# Patient Record
Sex: MALE | URBAN - METROPOLITAN AREA
[De-identification: names, ages, dates, MRNs, and addresses within clinical notes are randomized per-mention and may not be internally consistent; named-entity substitution may affect disease eponyms.]

---

## 2021-02-22 ENCOUNTER — OFFICE VISIT (OUTPATIENT)
Dept: URBAN - METROPOLITAN AREA CLINIC 62 | Facility: CLINIC | Age: 71
End: 2021-02-22
Payer: COMMERCIAL

## 2021-02-22 DIAGNOSIS — H04.123 DRY EYE SYNDROME OF BILATERAL LACRIMAL GLANDS: ICD-10-CM

## 2021-02-22 DIAGNOSIS — H35.3221 EXUDATIVE AGE-RELATED MACULAR DEGENERATION OF LEFT EYE W/ ACTIVE CHOROIDAL NEOVASCULARIZATION: Primary | ICD-10-CM

## 2021-02-22 PROCEDURE — 99204 OFFICE O/P NEW MOD 45 MIN: CPT | Performed by: OPTOMETRIST

## 2021-02-22 ASSESSMENT — VISUAL ACUITY
OS: CF 3FT
OD: 20/60

## 2021-02-22 ASSESSMENT — KERATOMETRY
OS: 43.75
OD: 43.63

## 2021-02-22 NOTE — IMPRESSION/PLAN
Impression: Exudative age-related macular degeneration of left eye w/ active choroidal neovascularization: H35.4086. Plan: Refer to Ret[facility].  Dr. Gricel Gambino for eval.

## 2021-02-22 NOTE — IMPRESSION/PLAN
Impression: Other age-related cataract: H25.89. Plan: Cataracts account for the patient's complaints. Discussed all risks, benefits, procedures and recovery. Patient understands changing glasses will not improve vision. Patient desires to have surgery and referred to Dr. Joaquín Ferro for phacoemulsification with intraocular lens.

## 2021-03-22 ENCOUNTER — OFFICE VISIT (OUTPATIENT)
Dept: URBAN - METROPOLITAN AREA CLINIC 63 | Facility: CLINIC | Age: 71
End: 2021-03-22
Payer: MEDICARE

## 2021-03-22 PROCEDURE — 99204 OFFICE O/P NEW MOD 45 MIN: CPT | Performed by: OPHTHALMOLOGY

## 2021-03-22 ASSESSMENT — KERATOMETRY
OS: 44.00
OD: 43.88

## 2021-03-22 ASSESSMENT — INTRAOCULAR PRESSURE
OD: 18
OS: 18

## 2021-03-22 NOTE — IMPRESSION/PLAN
Impression: Exudative age-related macular degeneration of left eye w/ active choroidal neovascularization: H35.3221.  Plan: Cont. treatment with retina specialist.

## 2021-03-22 NOTE — IMPRESSION/PLAN
Impression: Other age-related cataract: H25.89. Plan: Discussed surgery vs observation. Pt elects to wait until OS treatment completed.

## 2021-10-15 DIAGNOSIS — H25.89 OTHER AGE-RELATED CATARACT: ICD-10-CM

## 2021-10-15 PROCEDURE — 92014 COMPRE OPH EXAM EST PT 1/>: CPT | Performed by: OPHTHALMOLOGY

## 2021-10-15 ASSESSMENT — KERATOMETRY
OD: 43.75
OS: 44.25

## 2021-10-15 ASSESSMENT — VISUAL ACUITY
OD: 20/70
OS: 20/400

## 2021-10-15 ASSESSMENT — INTRAOCULAR PRESSURE
OS: 15
OD: 21

## 2021-10-15 NOTE — IMPRESSION/PLAN
Impression: Other age-related cataract: H25.89. Plan: Cataract accounts for pt complaints. Pt desires sx. Schedule CE/IOL right eye. Risk/Benefits/Alternatives discussed with patient. Rec. mono-focal only due to ARMD. RL2. Target near. Complex with trypan blue. Guarded visual prognosis due to poor view retina and ARMD. Combination/Generic drops. Consider ORA/LRI. Order a-scan. MiLoop. No Dexycu due to ARMD.  Recommended against driving due to limited vision. Rec. Intra-ocular cefuroxime to decrease the risk of endophthalmitis.

## 2021-11-03 ENCOUNTER — TESTING ONLY (OUTPATIENT)
Dept: URBAN - METROPOLITAN AREA CLINIC 63 | Facility: CLINIC | Age: 71
End: 2021-11-03
Payer: MEDICARE

## 2021-11-22 ENCOUNTER — PROCEDURE (OUTPATIENT)
Dept: URBAN - METROPOLITAN AREA SURGERY 37 | Facility: SURGERY | Age: 71
End: 2021-11-22
Payer: MEDICARE

## 2021-11-22 DIAGNOSIS — H52.223 REGULAR ASTIGMATISM, BILATERAL: Primary | ICD-10-CM

## 2021-11-22 PROCEDURE — 66984 XCAPSL CTRC RMVL W/O ECP: CPT | Performed by: OPHTHALMOLOGY

## 2021-11-23 ENCOUNTER — POST-OPERATIVE VISIT (OUTPATIENT)
Dept: URBAN - METROPOLITAN AREA CLINIC 63 | Facility: CLINIC | Age: 71
End: 2021-11-23
Payer: MEDICARE

## 2021-11-23 DIAGNOSIS — Z96.1 PRESENCE OF INTRAOCULAR LENS: Primary | ICD-10-CM

## 2021-11-23 PROCEDURE — 99024 POSTOP FOLLOW-UP VISIT: CPT | Performed by: OPTOMETRIST

## 2021-11-23 ASSESSMENT — INTRAOCULAR PRESSURE: OD: 12

## 2021-11-23 NOTE — IMPRESSION/PLAN
Impression: S/P Cataract Extraction by phacoemulsification with IOL placement; ORA; LRI (Limbal Relaxing Incision) OD - 1 Day. Presence of intraocular lens  Z96.1.  Plan: RTC 1 week PO2 combo drops 4x a day 
refresh as needed

## 2021-12-01 ENCOUNTER — POST-OPERATIVE VISIT (OUTPATIENT)
Dept: URBAN - METROPOLITAN AREA CLINIC 63 | Facility: CLINIC | Age: 71
End: 2021-12-01
Payer: MEDICARE

## 2021-12-01 DIAGNOSIS — Z48.810 ENCOUNTER FOR SURGICAL AFTERCARE FOLLOWING SURGERY ON A SENSE ORGAN: Primary | ICD-10-CM

## 2021-12-01 PROCEDURE — 99024 POSTOP FOLLOW-UP VISIT: CPT | Performed by: OPTOMETRIST

## 2021-12-01 ASSESSMENT — INTRAOCULAR PRESSURE: OD: 12

## 2021-12-01 ASSESSMENT — VISUAL ACUITY: OD: 20/60

## 2021-12-01 NOTE — IMPRESSION/PLAN
Impression: S/P Cataract Extraction by phacoemulsification with IOL placement; ORA; LRI (Limbal Relaxing Incision) OD - 9 Days. Encounter for surgical aftercare following surgery on a sense organ  Z48.810. Excellent post op course   Post operative instructions reviewed - Plan: Pt doing well POW #1. RTC 3 wks for f/u.  Combo QID OD, to start taper at [facility] today

## 2022-02-21 ENCOUNTER — POST-OPERATIVE VISIT (OUTPATIENT)
Dept: URBAN - METROPOLITAN AREA CLINIC 63 | Facility: CLINIC | Age: 72
End: 2022-02-21
Payer: MEDICARE

## 2022-02-21 PROCEDURE — 99024 POSTOP FOLLOW-UP VISIT: CPT | Performed by: OPTOMETRIST

## 2022-02-21 ASSESSMENT — INTRAOCULAR PRESSURE: OD: 16

## 2022-02-21 NOTE — IMPRESSION/PLAN
Impression: S/P Cataract Extraction by phacoemulsification with IOL placement; ORA; LRI (Limbal Relaxing Incision) OD - 91 Days. Presence of intraocular lens  Z96.1. Plan: Pt doing great POW #3. RTC 1 month1 for f/u and DFE.  Pt to have refraction

## 2022-03-22 ENCOUNTER — OFFICE VISIT (OUTPATIENT)
Dept: URBAN - METROPOLITAN AREA CLINIC 63 | Facility: CLINIC | Age: 72
End: 2022-03-22
Payer: MEDICARE

## 2022-03-22 DIAGNOSIS — H52.4 PRESBYOPIA: ICD-10-CM

## 2022-03-22 PROCEDURE — 92014 COMPRE OPH EXAM EST PT 1/>: CPT | Performed by: OPTOMETRIST

## 2022-03-22 ASSESSMENT — INTRAOCULAR PRESSURE
OD: 15
OS: 16